# Patient Record
Sex: MALE | URBAN - METROPOLITAN AREA
[De-identification: names, ages, dates, MRNs, and addresses within clinical notes are randomized per-mention and may not be internally consistent; named-entity substitution may affect disease eponyms.]

---

## 2023-10-24 ENCOUNTER — APPOINTMENT (RX ONLY)
Dept: URBAN - METROPOLITAN AREA CLINIC 144 | Facility: CLINIC | Age: 78
Setting detail: DERMATOLOGY
End: 2023-10-24

## 2023-10-24 DIAGNOSIS — M79.89 OTHER SPECIFIED SOFT TISSUE DISORDERS: ICD-10-CM

## 2023-10-24 PROCEDURE — ? PATIENT SPECIFIC COUNSELING

## 2023-10-24 PROCEDURE — 99212 OFFICE O/P EST SF 10 MIN: CPT

## 2023-10-24 ASSESSMENT — LOCATION SIMPLE DESCRIPTION DERM: LOCATION SIMPLE: LEFT HAND

## 2023-10-24 ASSESSMENT — LOCATION ZONE DERM: LOCATION ZONE: HAND

## 2023-10-24 ASSESSMENT — LOCATION DETAILED DESCRIPTION DERM: LOCATION DETAILED: LEFT RADIAL DORSAL HAND

## 2023-10-24 NOTE — HPI: FOLLOW UP
What Is The Condition For Which You Are Returning For Follow-Up?: other
Additional History: Patient fell on the sidewalk and went to [facility] [place] on 10/19/23.

## 2023-10-24 NOTE — PROCEDURE: PATIENT SPECIFIC COUNSELING
Other (Free Text): Provider went over patients records and unwrapped hand. Patient to rest and elevate hand. If patient is resting hand and not getting better, then we will order imaging.
Detail Level: Zone